# Patient Record
Sex: FEMALE | Race: WHITE | NOT HISPANIC OR LATINO
[De-identification: names, ages, dates, MRNs, and addresses within clinical notes are randomized per-mention and may not be internally consistent; named-entity substitution may affect disease eponyms.]

---

## 2017-10-26 PROBLEM — Z00.00 ENCOUNTER FOR PREVENTIVE HEALTH EXAMINATION: Status: ACTIVE | Noted: 2017-10-26

## 2017-11-08 ENCOUNTER — FORM ENCOUNTER (OUTPATIENT)
Age: 67
End: 2017-11-08

## 2017-11-09 ENCOUNTER — APPOINTMENT (OUTPATIENT)
Dept: ORTHOPEDIC SURGERY | Facility: CLINIC | Age: 67
End: 2017-11-09
Payer: MEDICARE

## 2017-11-09 ENCOUNTER — OUTPATIENT (OUTPATIENT)
Dept: OUTPATIENT SERVICES | Facility: HOSPITAL | Age: 67
LOS: 1 days | End: 2017-11-09
Payer: MEDICARE

## 2017-11-09 VITALS — BODY MASS INDEX: 29.02 KG/M2 | WEIGHT: 170 LBS | HEIGHT: 64 IN

## 2017-11-09 PROCEDURE — 99203 OFFICE O/P NEW LOW 30 MIN: CPT | Mod: 25

## 2017-11-09 PROCEDURE — 20610 DRAIN/INJ JOINT/BURSA W/O US: CPT | Mod: LT

## 2017-11-09 PROCEDURE — 73564 X-RAY EXAM KNEE 4 OR MORE: CPT | Mod: 26,50

## 2017-11-09 PROCEDURE — 73564 X-RAY EXAM KNEE 4 OR MORE: CPT

## 2017-11-09 RX ORDER — TAMOXIFEN CITRATE 20 MG/1
20 TABLET, FILM COATED ORAL
Refills: 0 | Status: ACTIVE | COMMUNITY

## 2017-11-09 RX ORDER — LITHIUM CARBONATE 600 MG/1
600 CAPSULE ORAL
Refills: 0 | Status: ACTIVE | COMMUNITY

## 2017-11-17 ENCOUNTER — APPOINTMENT (OUTPATIENT)
Dept: ORTHOPEDIC SURGERY | Facility: CLINIC | Age: 67
End: 2017-11-17

## 2018-08-02 ENCOUNTER — APPOINTMENT (OUTPATIENT)
Dept: ORTHOPEDIC SURGERY | Facility: CLINIC | Age: 68
End: 2018-08-02
Payer: MEDICARE

## 2018-08-02 DIAGNOSIS — M70.51 OTHER BURSITIS OF KNEE, RIGHT KNEE: ICD-10-CM

## 2018-08-02 DIAGNOSIS — M70.52 OTHER BURSITIS OF KNEE, LEFT KNEE: ICD-10-CM

## 2018-08-02 PROCEDURE — 99214 OFFICE O/P EST MOD 30 MIN: CPT

## 2019-06-24 ENCOUNTER — FORM ENCOUNTER (OUTPATIENT)
Age: 69
End: 2019-06-24

## 2019-06-25 ENCOUNTER — APPOINTMENT (OUTPATIENT)
Dept: ORTHOPEDIC SURGERY | Facility: CLINIC | Age: 69
End: 2019-06-25
Payer: MEDICARE

## 2019-06-25 ENCOUNTER — OUTPATIENT (OUTPATIENT)
Dept: OUTPATIENT SERVICES | Facility: HOSPITAL | Age: 69
LOS: 1 days | End: 2019-06-25
Payer: MEDICARE

## 2019-06-25 PROCEDURE — 73562 X-RAY EXAM OF KNEE 3: CPT | Mod: 26,50

## 2019-06-25 PROCEDURE — 73562 X-RAY EXAM OF KNEE 3: CPT

## 2019-06-25 PROCEDURE — 20610 DRAIN/INJ JOINT/BURSA W/O US: CPT | Mod: LT

## 2019-06-25 PROCEDURE — 99213 OFFICE O/P EST LOW 20 MIN: CPT | Mod: 25

## 2019-06-25 RX ORDER — MELOXICAM 15 MG/1
15 TABLET ORAL
Qty: 30 | Refills: 0 | Status: ACTIVE | COMMUNITY
Start: 2019-06-25 | End: 1900-01-01

## 2019-06-25 NOTE — PROCEDURE
[Left] : of the left [Joint Pain] : Joint pain [Injection] : Injection [Benefits] : benefits [Patient] : patient [Risk] : risk [Bleeding] : bleeding [Alternatives] : alternatives [Infection] : infection [Alcohol] : Alcohol [Allergic Reaction] : allergic reaction [Verbal Consent Obtained] : verbal consent was obtained prior to the procedure [Lateral] : lateral [20] : a 20-gauge [Betadine] : Betadine [1% Lidocaine___(mL)] : [unfilled] mL of 1% Lidocaine [Methylpred. 40mg/mL___(mL)] : [unfilled] mL of 40mg/mL methylprednisolone [Tolerated Well] : The patient tolerated the procedure well [None] : none [Bandage Applied] : a bandage

## 2019-06-28 NOTE — ASSESSMENT
[FreeTextEntry1] : plan:\par -injection performed today\par -continue to exercise\par -f/u in 6 weeks if pain persists....

## 2019-06-28 NOTE — PHYSICAL EXAM
[de-identified] : left knee:\par trace effusion\par neutral alignment\par stable v/v\par ROM 0-115\par stable lachman\par negative mcmurrays\par medial and lateral joint line tenderness [de-identified] : bilateral knees:\par varus OA worse on left knee\par

## 2019-06-28 NOTE — HISTORY OF PRESENT ILLNESS
[de-identified] : recent fall onto the left knee precipitating an arthritic flair that has nto resolved. she enjoys walking and is unable to do so. the is no fracture noted on XR.

## 2019-09-20 ENCOUNTER — APPOINTMENT (OUTPATIENT)
Dept: ORTHOPEDIC SURGERY | Facility: CLINIC | Age: 69
End: 2019-09-20
Payer: MEDICARE

## 2019-09-20 DIAGNOSIS — Z87.39 PERSONAL HISTORY OF OTHER DISEASES OF THE MUSCULOSKELETAL SYSTEM AND CONNECTIVE TISSUE: ICD-10-CM

## 2019-09-20 DIAGNOSIS — Z85.3 PERSONAL HISTORY OF MALIGNANT NEOPLASM OF BREAST: ICD-10-CM

## 2019-09-20 DIAGNOSIS — Z80.9 FAMILY HISTORY OF MALIGNANT NEOPLASM, UNSPECIFIED: ICD-10-CM

## 2019-09-20 DIAGNOSIS — M17.12 UNILATERAL PRIMARY OSTEOARTHRITIS, LEFT KNEE: ICD-10-CM

## 2019-09-20 DIAGNOSIS — Z82.61 FAMILY HISTORY OF ARTHRITIS: ICD-10-CM

## 2019-09-20 PROCEDURE — 99213 OFFICE O/P EST LOW 20 MIN: CPT

## 2019-09-20 RX ORDER — UBIDECARENONE/VIT E ACET 100MG-5
CAPSULE ORAL
Refills: 0 | Status: ACTIVE | COMMUNITY

## 2019-09-20 NOTE — PHYSICAL EXAM
[de-identified] : General: Well-nourished, well-developed, alert, and in no acute distress.\par Head: Normocephalic.\par Eyes: Pupils equal round reactive to light and accommodation, extraocular muscles intact, normal sclera.\par Nose: No nasal discharge.\par Cardiac: Regular rate. Extremities are warm and well perfused. Distal pulses are symmetric bilaterally.\par Respiratory: No labored breathing.\par Extremities: Sensation is intact distally bilaterally.  Distal pulses are symmetric bilaterally\par Neurologic: No focal deficits\par Skin: Normal skin color, texture, and turgor\par Psychiatric: Normal affect\par \par RIGHT Knee:\par \par Inspection: No bruising or rash\par Joint Effusion: no\par Range of Motion: Knee flexion 130 degrees, Knee extension 0 degrees\par Palpation: MILD MEDIAL joint line tenderness, no patellar facet tenderness, no tenderness at femoral condyle, no tenderness at tibial plateau, no patella tendon tenderness, no warmth\par Distal Pulses: Intact\par Lower extremity strength: Knee Extension 5/5 with no pain, Knee Flexion 5/5 with no pain, Hip Flexion 5/5 with no pain, Hip Abduction 5/5 with no pain \par Lower extremity reflexes: 2 Plus \par Lower extremity sensation: Intact\par \par Special Tests: \par [facility] medial negative, [facility] laterally negative\par Anterior Drawer: negative\par Posterior Drawer: negative\par Anterior Lachman: negative\par Varus Stress: negative with no gapping\par Valgus Stress: negative with no gapping\par \par \par LEFT Knee:\par \par Inspection: No bruising or rash\par Joint Effusion: no\par Range of Motion: Knee flexion 130 degrees, Knee extension 0 degrees\par Palpation:  MEDIAL and LATERAL joint line tenderness, DISTAL BICEPS FEMORIS TENDERNESS, MODERATE LATERAL patellar facet tenderness, no tenderness at femoral condyle, no tenderness at tibial plateau, no patella tendon tenderness, no warmth\par Distal Pulses: Intact\par Lower extremity strength: Knee Extension 5/5 with no pain, Knee Flexion 5/5 with no pain, Hip Flexion 5/5 with no pain, Hip Abduction 5/5 with no pain \par Lower extremity reflexes: 2 Plus \par Lower extremity sensation: Intact\par \par Special Tests: \par [facility] medial negative, [facility] laterally negative\par Anterior Drawer: negative\par Posterior Drawer: negative\par Anterior Lachman: negative\par Varus Stress: negative with no gapping\par Valgus Stress: negative with no gapping\par \par  [de-identified] : X-ray from 6--bilateral osteoarthrosis, left WORSE THAN and right. No significant interval change.

## 2019-09-20 NOTE — REVIEW OF SYSTEMS
[Negative] : Heme/Lymph [Joint Pain] : joint pain [Joint Stiffness] : joint stiffness [Recent Weight Gain (___ Lbs)] : recent ~M [unfilled] lbs weight gain [Decrease Hearing] : decrease hearing

## 2019-09-20 NOTE — DISCUSSION/SUMMARY
[Medication Risks Reviewed] : Medication risks reviewed [de-identified] : The patient is a 69-year-old woman presented with a one-month history of acute on chronic left knee pain. Her diagnosis is consistent with a stress riser the left knee.\par ----------------------------------------------------------------------------------------------------------------------------------------------------------------------------------------------------------gavino I personally reviewed imaging. Imaging results were reviewed with the patient in detail. All questions were answered appropriately.\par ----------------------------------------------------------------------------------------------------------------------------------------------------------------------------------------------------------gavino VARELA discussed the treatment of degenerative arthritis with the patient at length today. I described the spectrum of treatment from nonoperative modalities to total joint arthroplasty. Noninvasive and nonoperative treatment modalities include weight reduction, activity modification with low impact exercise, PRN use of acetaminophen or anti-inflammatory medication if tolerated, glucosamine/chondroitin supplements, and physical therapy. Further treatments can include corticosteroid injection and the use of hyaluronic acid injections. Definitive treatment can certainly include total joint arthroplasty, but patient would require surgical consultation to discuss that option further. The risks and benefits of each treatment options was discussed and all questions were answered.\par \par Patient last had a cortisone injection 3 months ago, and she is not a candidate for cortisone injection today.\par \par Patient was instructed to take Meloxicam with meals for a short course.  She may also take Tylenol as needed for pain. Appropriate use of medication was reviewed with the patient in detail. Risks, benefits, and adverse effects medication were discussed. Patient was counseled on activity modification. She was counseled on use of bracing, and she may try a hinged knee brace. She was given a prescription for physical therapy and a list of home exercises.\par \par Patient may benefit from viscosupplement injections. She will consider this in the future.\par \par Patient instructed to follow up in 4-6 weeks\par \par -------------------------------------------------------------------------------------------------------------------------------------------\par Patient appreciates and agrees with current plan.  All of the patient's questions were answered appropriately.\par \par This note was generated using dragon medical dictation software.  A reasonable effort has been made for proofreading its contents, but typos may still remain.  If there are any questions or points of clarification needed please notify my office.\par \par